# Patient Record
Sex: MALE | Race: WHITE | NOT HISPANIC OR LATINO | URBAN - METROPOLITAN AREA
[De-identification: names, ages, dates, MRNs, and addresses within clinical notes are randomized per-mention and may not be internally consistent; named-entity substitution may affect disease eponyms.]

---

## 2021-12-21 ENCOUNTER — APPOINTMENT (RX ONLY)
Dept: URBAN - METROPOLITAN AREA CLINIC 12 | Facility: CLINIC | Age: 51
Setting detail: DERMATOLOGY
End: 2021-12-21

## 2021-12-21 PROBLEM — C43.72 MALIGNANT MELANOMA OF LEFT LOWER LIMB, INCLUDING HIP: Status: ACTIVE | Noted: 2021-12-21

## 2021-12-21 PROCEDURE — ? PATIENT SPECIFIC COUNSELING

## 2021-12-21 PROCEDURE — ? PRE-OP WORKLIST

## 2021-12-21 PROCEDURE — 99203 OFFICE O/P NEW LOW 30 MIN: CPT

## 2021-12-21 PROCEDURE — ? COUNSELING - MELANOMA

## 2021-12-21 NOTE — HPI: MOHS RECONSTRUCTION SINGLE DEFECT EVALUATION
How Many Reconstruction Sites Are To Be Evaluated?: 1
Is This A New Presentation, Presentation For Surgery, Or A Follow-Up?: Mohs Reconstruction Consultation
Mohs Surgeon's Name: Dr. Thomason

## 2021-12-21 NOTE — PROCEDURE: PRE-OP WORKLIST
Surgery Scheduled: Left calf reconstruction with local flap vs STSG
Photos Taken?: yes
Coordination With:: Dr. Thomason
Date Of Surgery: TBA
Surgeon: Dr. Encarnacion
Detail Level: Simple

## 2021-12-21 NOTE — PROCEDURE: PATIENT SPECIFIC COUNSELING
Other (Free Text): Patient presents today as a melanoma closure, on the left calf, consultation, referred by Dr. FERRIS. Patient reports no concerns. Lesion was biopsied approximately a month ago. Patient has a mole there his whole life, however in the past couple years it’s gotten larger and has changed color. Patient is  of a hospital. A1C - 7.1\\nDr. Shashank stepped in and voiced that there are two ways the defect could be repaired. \\n1) a flap where tissue from the area is used to close up the defect, this will be the first method of choice if there’s enough skin in the area.  \\n2) skin graft from upper thigh. Patient would need to have his leg up for two weeks, patient wouldn’t be able to walk around and work. This recovery time will be longer than the flap. \\nInformed patient that being a type 2 diabetic and having the lesion below the waist , healing will take a while because of these factors. Patient was given a prescription and signed [facility] consent today. \\nPatient verbalized understanding and is to RtC post procedure.
Detail Level: Zone

## 2022-02-02 ENCOUNTER — APPOINTMENT (RX ONLY)
Dept: URBAN - METROPOLITAN AREA CLINIC 12 | Facility: CLINIC | Age: 52
Setting detail: DERMATOLOGY
End: 2022-02-02

## 2022-02-02 DIAGNOSIS — Z48.89 ENCOUNTER FOR OTHER SPECIFIED SURGICAL AFTERCARE: ICD-10-CM

## 2022-02-02 PROCEDURE — ? COUNSELING - POST-OP CHECK, COMPLEX WOUND RECONSTRUCTION

## 2022-02-02 PROCEDURE — ? PATIENT SPECIFIC COUNSELING

## 2022-02-02 PROCEDURE — 99024 POSTOP FOLLOW-UP VISIT: CPT

## 2022-02-02 PROCEDURE — ? POST-OP CHECK

## 2022-02-02 PROCEDURE — ? DRESSING CHANGE

## 2022-02-02 ASSESSMENT — LOCATION SIMPLE DESCRIPTION DERM
LOCATION SIMPLE: LEFT PRETIBIAL REGION
LOCATION SIMPLE: LEFT PRETIBIAL REGION

## 2022-02-02 ASSESSMENT — LOCATION ZONE DERM
LOCATION ZONE: LEG
LOCATION ZONE: LEG

## 2022-02-02 ASSESSMENT — LOCATION DETAILED DESCRIPTION DERM
LOCATION DETAILED: LEFT LATERAL DISTAL PRETIBIAL REGION
LOCATION DETAILED: LEFT LATERAL DISTAL PRETIBIAL REGION

## 2022-02-02 NOTE — PROCEDURE: POST-OP CHECK
Detail Level: Detailed
Add Postop Global No-Charge Code (14150)?: yes
Other Dressings: Ace Wrap
Wound Evaluated By: Dr. Encarnacion

## 2022-02-02 NOTE — PROCEDURE: PATIENT SPECIFIC COUNSELING
Other (Free Text): Patient presents today for PostOp check for Closure on Left calf from Melanoma removal performed on 1/27/2022. Patient reports no pain or discomfort and is healing well. Dr. Encarnacion examined patient and reports patient is healing well, no signs of infection, bleeding, hematoma or Seroma. Reports sutures are not ready to be removed today, advised patient to continue ace wrapping left lower leg and wearing  immobilization CAM Boot. Avoid shower water betting directly in the area and no submerging. Patient may return to work. Patient verbalized understanding and advised to follow up in 2 weeks.
Detail Level: Simple

## 2022-02-18 ENCOUNTER — APPOINTMENT (RX ONLY)
Dept: URBAN - METROPOLITAN AREA CLINIC 12 | Facility: CLINIC | Age: 52
Setting detail: DERMATOLOGY
End: 2022-02-18

## 2022-02-18 DIAGNOSIS — Z48.89 ENCOUNTER FOR OTHER SPECIFIED SURGICAL AFTERCARE: ICD-10-CM

## 2022-02-18 PROCEDURE — ? PATIENT SPECIFIC COUNSELING

## 2022-02-18 PROCEDURE — ? SUTURE REMOVAL

## 2022-02-18 PROCEDURE — ? COUNSELING - POST-OP CHECK, COMPLEX WOUND RECONSTRUCTION

## 2022-02-18 PROCEDURE — ? POST-OP CHECK

## 2022-02-18 PROCEDURE — ? DRESSING CHANGE

## 2022-02-18 PROCEDURE — 99024 POSTOP FOLLOW-UP VISIT: CPT

## 2022-02-18 ASSESSMENT — LOCATION SIMPLE DESCRIPTION DERM
LOCATION SIMPLE: LEFT PRETIBIAL REGION
LOCATION SIMPLE: LEFT PRETIBIAL REGION

## 2022-02-18 ASSESSMENT — LOCATION DETAILED DESCRIPTION DERM
LOCATION DETAILED: LEFT LATERAL DISTAL PRETIBIAL REGION
LOCATION DETAILED: LEFT LATERAL DISTAL PRETIBIAL REGION
LOCATION DETAILED: LEFT LATERAL PROXIMAL PRETIBIAL REGION

## 2022-02-18 ASSESSMENT — LOCATION ZONE DERM
LOCATION ZONE: LEG
LOCATION ZONE: LEG

## 2022-02-18 NOTE — PROCEDURE: POST-OP CHECK
Detail Level: Detailed
Add Postop Global No-Charge Code (13319)?: yes
Other Dressings: Ace Wrap
Wound Evaluated By: Dr. Encarnacion

## 2022-02-18 NOTE — PROCEDURE: PATIENT SPECIFIC COUNSELING
Other (Free Text): Patient presents today for PostOp check for Closure on Left calf from Melanoma removal performed on 1/27/2022. Patient reports no pain or discomfort and is healing well. \\n\\Joyce Encarnacion stepped in, voiced his leg looks great, clean , dry, no signs of infection, no hematoma. Dr. Encarnacion explained that his sutures are partially ready for removal and we’re removed without complication. Advised patient to return next Tuesday for remaining sutures.
Detail Level: Simple

## 2022-03-02 ENCOUNTER — APPOINTMENT (RX ONLY)
Dept: URBAN - METROPOLITAN AREA CLINIC 12 | Facility: CLINIC | Age: 52
Setting detail: DERMATOLOGY
End: 2022-03-02

## 2022-03-02 DIAGNOSIS — Z48.02 ENCOUNTER FOR REMOVAL OF SUTURES: ICD-10-CM

## 2022-03-02 PROCEDURE — ? SUTURE REMOVAL

## 2022-03-02 PROCEDURE — 99212 OFFICE O/P EST SF 10 MIN: CPT

## 2022-03-02 PROCEDURE — ? COUNSELING - SUTURE REMOVAL

## 2022-03-02 PROCEDURE — 99024 POSTOP FOLLOW-UP VISIT: CPT

## 2022-03-02 PROCEDURE — ? PATIENT SPECIFIC COUNSELING

## 2022-03-02 ASSESSMENT — LOCATION DETAILED DESCRIPTION DERM
LOCATION DETAILED: LEFT PROXIMAL PRETIBIAL REGION
LOCATION DETAILED: LEFT PROXIMAL PRETIBIAL REGION

## 2022-03-02 ASSESSMENT — LOCATION SIMPLE DESCRIPTION DERM
LOCATION SIMPLE: LEFT PRETIBIAL REGION
LOCATION SIMPLE: LEFT PRETIBIAL REGION

## 2022-03-02 ASSESSMENT — LOCATION ZONE DERM
LOCATION ZONE: LEG
LOCATION ZONE: LEG

## 2022-03-02 NOTE — PROCEDURE: PATIENT SPECIFIC COUNSELING
Other (Free Text): Patient presents for suture removal. Sutures were removed without complication advised to follow up in 3 weeks
Detail Level: Zone

## 2022-03-22 ENCOUNTER — APPOINTMENT (RX ONLY)
Dept: URBAN - METROPOLITAN AREA CLINIC 12 | Facility: CLINIC | Age: 52
Setting detail: DERMATOLOGY
End: 2022-03-22

## 2022-03-22 DIAGNOSIS — Z48.89 ENCOUNTER FOR OTHER SPECIFIED SURGICAL AFTERCARE: ICD-10-CM

## 2022-03-22 PROCEDURE — 99212 OFFICE O/P EST SF 10 MIN: CPT

## 2022-03-22 PROCEDURE — ? PATIENT SPECIFIC COUNSELING

## 2022-03-22 PROCEDURE — ? POST-OP CHECK

## 2022-03-22 PROCEDURE — ? COUNSELING - POST-OP CHECK

## 2022-03-22 PROCEDURE — 99024 POSTOP FOLLOW-UP VISIT: CPT

## 2022-03-22 ASSESSMENT — LOCATION DETAILED DESCRIPTION DERM
LOCATION DETAILED: LEFT LATERAL DISTAL PRETIBIAL REGION
LOCATION DETAILED: LEFT DISTAL PRETIBIAL REGION

## 2022-03-22 ASSESSMENT — LOCATION ZONE DERM: LOCATION ZONE: LEG

## 2022-03-22 ASSESSMENT — LOCATION SIMPLE DESCRIPTION DERM: LOCATION SIMPLE: LEFT PRETIBIAL REGION

## 2022-03-22 NOTE — PROCEDURE: PATIENT SPECIFIC COUNSELING
Other (Free Text): Patient presents s/p closure on left calf performed 1/22/22. Patient is doing well. Here to have the scar checked.\\n\\Joyce Encarnacion voiced that his leg looks great, advised to keep the the scar out of the sun. Advised to follow up with his dermatologist. Patient is graduated
Detail Level: Zone

## 2023-02-05 ENCOUNTER — OUT OF OFFICE VISIT (OUTPATIENT)
Dept: URBAN - METROPOLITAN AREA MEDICAL CENTER 27 | Facility: MEDICAL CENTER | Age: 53
End: 2023-02-05
Payer: COMMERCIAL

## 2023-02-05 DIAGNOSIS — R41.82 ALTERED MENTAL STATE: ICD-10-CM

## 2023-02-05 DIAGNOSIS — R74.01 ABNORMAL/ELEVATED TRANSAMINASE (SGOT, AMINOTRANSFERASE): ICD-10-CM

## 2023-02-05 DIAGNOSIS — R74.8 ABNORMAL ALKALINE PHOSPHATASE TEST: ICD-10-CM

## 2023-02-05 DIAGNOSIS — K80.20 ASYMPTOMATIC CHOLELITHIASIS: ICD-10-CM

## 2023-02-05 PROCEDURE — G8427 DOCREV CUR MEDS BY ELIG CLIN: HCPCS | Performed by: INTERNAL MEDICINE

## 2023-02-05 PROCEDURE — 99222 1ST HOSP IP/OBS MODERATE 55: CPT | Performed by: INTERNAL MEDICINE

## 2023-02-06 ENCOUNTER — LAB OUTSIDE AN ENCOUNTER (OUTPATIENT)
Dept: URBAN - METROPOLITAN AREA CLINIC 78 | Facility: CLINIC | Age: 53
End: 2023-02-06

## 2023-02-06 ENCOUNTER — OUT OF OFFICE VISIT (OUTPATIENT)
Dept: URBAN - METROPOLITAN AREA MEDICAL CENTER 27 | Facility: MEDICAL CENTER | Age: 53
End: 2023-02-06
Payer: COMMERCIAL

## 2023-02-06 DIAGNOSIS — R74.01 ABNORMAL/ELEVATED TRANSAMINASE (SGOT, AMINOTRANSFERASE): ICD-10-CM

## 2023-02-06 DIAGNOSIS — R74.8 ABNORMAL ALKALINE PHOSPHATASE TEST: ICD-10-CM

## 2023-02-06 DIAGNOSIS — R41.82 ALTERED MENTAL STATE: ICD-10-CM

## 2023-02-06 DIAGNOSIS — K80.20 ASYMPTOMATIC CHOLELITHIASIS: ICD-10-CM

## 2023-02-06 PROCEDURE — 99232 SBSQ HOSP IP/OBS MODERATE 35: CPT | Performed by: INTERNAL MEDICINE
